# Patient Record
Sex: FEMALE | Race: WHITE | NOT HISPANIC OR LATINO | Employment: OTHER | URBAN - METROPOLITAN AREA
[De-identification: names, ages, dates, MRNs, and addresses within clinical notes are randomized per-mention and may not be internally consistent; named-entity substitution may affect disease eponyms.]

---

## 2020-03-11 ENCOUNTER — OFFICE VISIT (OUTPATIENT)
Dept: UROLOGY | Facility: CLINIC | Age: 75
End: 2020-03-11
Payer: MEDICARE

## 2020-03-11 VITALS
DIASTOLIC BLOOD PRESSURE: 78 MMHG | SYSTOLIC BLOOD PRESSURE: 131 MMHG | HEIGHT: 63 IN | BODY MASS INDEX: 31.18 KG/M2 | WEIGHT: 176 LBS

## 2020-03-11 DIAGNOSIS — N32.81 OVERACTIVE BLADDER: Primary | ICD-10-CM

## 2020-03-11 DIAGNOSIS — N39.0 URINARY TRACT INFECTION WITHOUT HEMATURIA, SITE UNSPECIFIED: ICD-10-CM

## 2020-03-11 LAB
BILIRUB UR QL STRIP: NEGATIVE
GLUCOSE UR QL STRIP: NEGATIVE
KETONES UR QL STRIP: POSITIVE
LEUKOCYTE ESTERASE UR QL STRIP: POSITIVE
PH, POC UA: 5.5
POC AMORP, URINE: ABNORMAL
POC BACTI, URINE: ABNORMAL
POC BLOOD, URINE: POSITIVE
POC CASTS, URINE: ABNORMAL
POC CRYST, URINE: ABNORMAL
POC EPITH, URINE: ABNORMAL
POC HCG, URINE: ABNORMAL
POC HYALIN, URINE: ABNORMAL LPF
POC MUCUS, URINE: +1=
POC NITRATES, URINE: POSITIVE
POC OTHER, URINE: ABNORMAL
POC RBC, URINE: ABNORMAL HPF
POC RESIDUAL URINE VOLUME: 0 ML (ref 0–100)
POC WBC, URINE: ABNORMAL HPF
PROT UR QL STRIP: POSITIVE
SP GR UR STRIP: 1.02 (ref 1–1.03)
UROBILINOGEN UR STRIP-ACNC: 0.2 (ref 0.1–1.1)

## 2020-03-11 PROCEDURE — 51798 POCT BLADDER SCAN: ICD-10-PCS | Mod: S$GLB,,, | Performed by: NURSE PRACTITIONER

## 2020-03-11 PROCEDURE — 99214 OFFICE O/P EST MOD 30 MIN: CPT | Mod: 25,S$GLB,, | Performed by: NURSE PRACTITIONER

## 2020-03-11 PROCEDURE — 99214 PR OFFICE/OUTPT VISIT, EST, LEVL IV, 30-39 MIN: ICD-10-PCS | Mod: 25,S$GLB,, | Performed by: NURSE PRACTITIONER

## 2020-03-11 PROCEDURE — 51798 US URINE CAPACITY MEASURE: CPT | Mod: S$GLB,,, | Performed by: NURSE PRACTITIONER

## 2020-03-11 RX ORDER — OXYBUTYNIN CHLORIDE 5 MG/1
10 TABLET ORAL NIGHTLY
COMMUNITY
End: 2021-04-06

## 2020-03-11 RX ORDER — CEPHALEXIN 250 MG/1
250 CAPSULE ORAL DAILY
COMMUNITY
End: 2020-03-16

## 2020-03-11 RX ORDER — CLONAZEPAM 1 MG/1
TABLET ORAL
COMMUNITY
Start: 2020-02-20

## 2020-03-11 NOTE — PROGRESS NOTES
Subjective:       Patient ID: Laurence Donaldson is a 74 y.o. female.    Chief Complaint: Other (6 mo f/u)      HPI: 74-year-old female known service Dr. Saab  with a history of overactive bladder.  She was last seen August of 2019.  Prior to that she had been on Ditropan with patient's stop taking of her own accord.  She continues with excessive daytime Coca-Cola intake and caffeinated tea.  Daytime she has frequency every 30-45 minutes voiding small volumes.  Since last office visit she has been seen by Dr. Castaneda and was placed back on Ditropan 5 mg tablet x2 q.h.s., with instruction to discontinue or significantly reduce the number of soda and caffeinated beverages through the daytime.  She has not changed her dietary fluid intake or choice of beverages, but does sleep through the night on Ditropan 10 mg.  She returns to clinic today stating that she was also placed on Keflex 250 mg daily to reduce risk of recurring urinary tract infection.  Denies gross hematuria, dysuria or stress incontinence.  No flank pain.  Bowel movements reported daily in normal       Past Medical History:   Past Medical History:   Diagnosis Date    Anxiety     Breast cancer     Depression     Elevated cholesterol     Recurrent urinary tract infection        Past Surgical Historical:   Past Surgical History:   Procedure Laterality Date    BLADDER SURGERY      MASTECTOMY Bilateral     NECK SURGERY          Medications:   Medication List with Changes/Refills   Current Medications    CEPHALEXIN (KEFLEX) 250 MG CAPSULE    Take 250 mg by mouth once daily.    CLONAZEPAM (KLONOPIN) 1 MG TABLET    TK 1 T PO  QD    OXYBUTYNIN (DITROPAN) 5 MG TAB    Take 10 mg by mouth every evening.        Past Social History:   Social History     Socioeconomic History    Marital status: Single     Spouse name: Not on file    Number of children: Not on file    Years of education: Not on file    Highest education level: Not on file   Occupational  History    Not on file   Social Needs    Financial resource strain: Not on file    Food insecurity:     Worry: Not on file     Inability: Not on file    Transportation needs:     Medical: Not on file     Non-medical: Not on file   Tobacco Use    Smoking status: Never Smoker   Substance and Sexual Activity    Alcohol use: Not Currently    Drug use: Not on file    Sexual activity: Not on file   Lifestyle    Physical activity:     Days per week: Not on file     Minutes per session: Not on file    Stress: Not on file   Relationships    Social connections:     Talks on phone: Not on file     Gets together: Not on file     Attends Orthodox service: Not on file     Active member of club or organization: Not on file     Attends meetings of clubs or organizations: Not on file     Relationship status: Not on file   Other Topics Concern    Not on file   Social History Narrative    Not on file       Allergies:   Review of patient's allergies indicates:   Allergen Reactions    Morphine         Family History: History reviewed. No pertinent family history.     Review of Systems:  Review of Systems   Constitutional: Negative for activity change and appetite change.   HENT: Negative for congestion and dental problem.    Respiratory: Negative for chest tightness and shortness of breath.    Cardiovascular: Negative for chest pain.   Gastrointestinal: Negative for abdominal distention and abdominal pain.   Genitourinary: Negative for decreased urine volume, difficulty urinating, dyspareunia, dysuria, enuresis, flank pain, frequency, genital sores, hematuria, pelvic pain and urgency.   Musculoskeletal: Negative for back pain and neck pain.   Allergic/Immunologic: Negative for immunocompromised state.   Neurological: Negative for dizziness.   Hematological: Negative for adenopathy.   Psychiatric/Behavioral: Negative for agitation, behavioral problems and confusion.       Physical Exam:  Physical Exam   Constitutional: She  is oriented to person, place, and time. She appears well-developed and well-nourished.   HENT:   Head: Normocephalic and atraumatic.   Eyes: No scleral icterus.   Neck: Normal range of motion.   Cardiovascular: Intact distal pulses.    Pulmonary/Chest: Effort normal and breath sounds normal.   Abdominal: Soft. She exhibits no distension. There is no tenderness.   Genitourinary: Rectum normal and vagina normal. Pelvic exam was performed with patient supine. There is no rash, tenderness or lesion on the right labia. There is no rash, tenderness or lesion on the left labia.   Musculoskeletal: She exhibits no edema.   Neurological: She is alert and oriented to person, place, and time.   Skin: Skin is warm and dry.     Psychiatric: She has a normal mood and affect.       Assessment/Plan:   Recurring urinary tract infection--urinalysis today WBC 50-60/HPF; RBC 0 2/HPF, 3+ bacteria nitrite positive.  Patient is on Keflex 250 daily at present.  Will culture urine and treat based on sensitivities.  Patient's last cystoscopy with CMG leak point was in 2017.  She had renal ultrasound read is within normal limits on January of 2019.  Will repeat renal ultrasound scheduled for cystoscopy for further evaluation of her recurring UTIs    Overactive bladder--patient gets a good response from oxybutynin 5 mg tablet x2 q.h.s. as she sleeps through the night.  Her symptoms seem to be through the daytime and significantly aggravated by her large intake of sodas and other caffeinated beverages.  Again I have asked her to moderate if she is not able to completely discontinue those she verbalized understanding but states he is not sure she can do that.  I asked her to dose 5 mg of oxybutynin the a.m. to see if this helps somewhat through the morning hours and if so she may want to adjust her daily dosing to 5 mg t.i.d. versus her current regimen.  She verbalized understanding of the same  Problem List Items Addressed This Visit     None

## 2020-03-14 LAB — URINE CULTURE, ROUTINE: NORMAL

## 2020-03-16 RX ORDER — SULFAMETHOXAZOLE AND TRIMETHOPRIM 800; 160 MG/1; MG/1
1 TABLET ORAL 2 TIMES DAILY
Qty: 14 TABLET | Refills: 0 | Status: SHIPPED | OUTPATIENT
Start: 2020-03-16 | End: 2020-03-23

## 2020-03-18 ENCOUNTER — TELEPHONE (OUTPATIENT)
Dept: UROLOGY | Facility: CLINIC | Age: 75
End: 2020-03-18

## 2020-03-18 NOTE — TELEPHONE ENCOUNTER
----- Message from Mara Baker sent at 3/17/2020  3:54 PM CDT -----  Contact: pt-Mr.Gage Cunha   Type:  Test Results    Who Called: Laurence Donaldson  Name of Test (Lab/Mammo/Etc):  Culture results   Date of Test: 3/11/20  Ordering Provider: Chad  Where the test was performed: urology  Would the patient rather a call back or a response via [facility]? call  Best Call Back Number: [phone]  Additional Information:

## 2020-04-28 ENCOUNTER — PROCEDURE VISIT (OUTPATIENT)
Dept: UROLOGY | Facility: CLINIC | Age: 75
End: 2020-04-28
Payer: MEDICARE

## 2020-04-28 VITALS
RESPIRATION RATE: 18 BRPM | HEIGHT: 63 IN | DIASTOLIC BLOOD PRESSURE: 82 MMHG | HEART RATE: 67 BPM | WEIGHT: 177 LBS | OXYGEN SATURATION: 97 % | SYSTOLIC BLOOD PRESSURE: 182 MMHG | BODY MASS INDEX: 31.36 KG/M2

## 2020-04-28 DIAGNOSIS — N39.0 URINARY TRACT INFECTION WITHOUT HEMATURIA, SITE UNSPECIFIED: Primary | ICD-10-CM

## 2020-04-28 PROCEDURE — 52000 CYSTOSCOPY: ICD-10-PCS | Mod: S$GLB,,, | Performed by: UROLOGY

## 2020-04-28 PROCEDURE — 96372 PR INJECTION,THERAP/PROPH/DIAG2ST, IM OR SUBCUT: ICD-10-PCS | Mod: 59,S$GLB,, | Performed by: UROLOGY

## 2020-04-28 PROCEDURE — 52000 CYSTOURETHROSCOPY: CPT | Mod: S$GLB,,, | Performed by: UROLOGY

## 2020-04-28 PROCEDURE — 96372 THER/PROPH/DIAG INJ SC/IM: CPT | Mod: 59,S$GLB,, | Performed by: UROLOGY

## 2020-04-28 RX ORDER — GENTAMICIN SULFATE 40 MG/ML
80 INJECTION, SOLUTION INTRAMUSCULAR; INTRAVENOUS
Status: COMPLETED | OUTPATIENT
Start: 2020-04-28 | End: 2020-04-28

## 2020-04-28 RX ORDER — BENZONATATE 100 MG/1
CAPSULE ORAL
COMMUNITY
Start: 2020-03-13

## 2020-04-28 RX ORDER — CIPROFLOXACIN 500 MG/1
500 TABLET ORAL
Status: COMPLETED | OUTPATIENT
Start: 2020-04-28 | End: 2020-04-28

## 2020-04-28 RX ORDER — PRAVASTATIN SODIUM 20 MG/1
TABLET ORAL
COMMUNITY
Start: 2020-02-16

## 2020-04-28 RX ORDER — AZELASTINE 1 MG/ML
SPRAY, METERED NASAL
COMMUNITY
Start: 2020-03-13

## 2020-04-28 RX ADMIN — GENTAMICIN SULFATE 80 MG: 40 INJECTION, SOLUTION INTRAMUSCULAR; INTRAVENOUS at 10:04

## 2020-04-28 RX ADMIN — CIPROFLOXACIN 500 MG: 500 TABLET ORAL at 09:04

## 2020-04-28 NOTE — PROCEDURES
"Cystoscopy  Date/Time: 4/28/2020 10:00 AM  Performed by: Santy Saab MD  Authorized by: Santy Saab MD     Consent Done?:  Yes (Written)  Time out: Immediately prior to procedure a "time out" was called to verify the correct patient, procedure, equipment, support staff and site/side marked as required.    Indications: recurrent UTIs    Position:  Dorsal lithotomy  Anesthesia:  Intraurethral instillation  Patient sedated?: No    Preparation: Patient was prepped and draped in usual sterile fashion      Scope type:  Rigid cystoscope  Stent inserted: No    Stent removed: No    External exam normal: Yes    Digital exam performed: No    Urethra normal: Yes (no hypermobility no leakage with valsalva)  Bladder neck normal: Bladder neck normal   Normal bladder: fair amount of cystitis cystica along trigone.      Patient tolerance:  Patient tolerated the procedure well with no immediate complications     Will culture urine.  Pt needs to increase water intake.  We gave her a shot of gent and 2 days of cipro.  Based on culture may start her on a low dose antibiotic.  Pr will fu in two weeks      "

## 2020-04-28 NOTE — PATIENT INSTRUCTIONS
Cystoscopy    Cystoscopy is a procedure that lets your doctor look directly inside your urethra and bladder. It can be used to:  · Help diagnose a problem with your urethra, bladder, or kidneys.  · Take a sample (biopsy) of bladder or urethral tissue.  · Treat certain problems (such as removing kidney stones).  · Place a stent to bypass an obstruction.  · Take special X-rays of the kidneys.  Based on the findings, your doctor may recommend other tests or treatments.  What is a cystoscope?  A cystoscope is a telescope-like instrument that contains lenses and fiberoptics (small glass wires that make bright light). The cystoscope may be straight and rigid, or flexible to bend around curves in the urethra. The doctor may look directly into the cystoscope, or project the image onto a monitor.  Getting ready  · Ask your doctor if you should stop taking any medicines before the procedure.  · Ask whether you should avoid eating or drinking anything after midnight before the procedure.  · Follow any other instructions your doctor gives you.  Tell your doctor before the exam if you:  · Take any medicines, such as aspirin or blood thinners  · Have allergies to any medicines  · Are pregnant   The procedure  Cystoscopy is done in the doctors office, surgery center, or hospital. The doctor and a nurse are present during the procedure. It takes only a few minutes, longer if a biopsy, X-ray, or treatment needs to be done.  During the procedure:  · You lie on an exam table on your back, knees bent and legs apart. You are covered with a drape.  · Your urethra and the area around it are washed. Anesthetic jelly may be applied to numb the urethra. Other pain medicine is usually not needed. In some cases, you may be offered a mild sedative to help you relax. If a more extensive procedure is to be done, such as a biopsy or kidney stone removal, general anesthesia may be needed.  · The cystoscope is inserted. A sterile fluid is put  into the bladder to expand it. You may feel pressure from this fluid.  · When the procedure is done, the cystoscope is removed.  After the procedure  If you had a sedative, general anesthesia, or spinal anesthesia, you must have someone drive you home. Once youre home:  · Drink plenty of fluids.  · You may have burning or light bleeding when you urinate--this is normal.  · Medicines may be prescribed to ease any discomfort or prevent infection. Take these as directed.  · Call your doctor if you have heavy bleeding or blood clots, burning that lasts more than a day, a fever over 100°F  (38° C), or trouble urinating.  Date Last Reviewed: 1/1/2017  © 0868-0047 [employer]. [address] [place] [address], [place] [address]. All rights reserved. This information is not intended as a substitute for professional medical care. Always follow your healthcare professional's instructions.

## 2020-04-30 LAB — URINE CULTURE, ROUTINE: NORMAL

## 2020-04-30 RX ORDER — CIPROFLOXACIN 500 MG/1
500 TABLET ORAL 2 TIMES DAILY
Qty: 14 TABLET | Refills: 0 | Status: SHIPPED | OUTPATIENT
Start: 2020-04-30 | End: 2020-05-07

## 2020-05-12 ENCOUNTER — OFFICE VISIT (OUTPATIENT)
Dept: UROLOGY | Facility: CLINIC | Age: 75
End: 2020-05-12
Payer: MEDICARE

## 2020-05-12 VITALS
WEIGHT: 177 LBS | SYSTOLIC BLOOD PRESSURE: 150 MMHG | RESPIRATION RATE: 20 BRPM | BODY MASS INDEX: 31.35 KG/M2 | TEMPERATURE: 98 F | DIASTOLIC BLOOD PRESSURE: 84 MMHG | OXYGEN SATURATION: 96 % | HEART RATE: 90 BPM

## 2020-05-12 DIAGNOSIS — N39.0 URINARY TRACT INFECTION WITHOUT HEMATURIA, SITE UNSPECIFIED: Primary | ICD-10-CM

## 2020-05-12 LAB
BILIRUB UR QL STRIP: POSITIVE
GLUCOSE UR QL STRIP: NEGATIVE
KETONES UR QL STRIP: POSITIVE
LEUKOCYTE ESTERASE UR QL STRIP: POSITIVE
PH, POC UA: 6
POC AMORP, URINE: ABNORMAL
POC BACTI, URINE: ABNORMAL
POC BLOOD, URINE: POSITIVE
POC CASTS, URINE: ABNORMAL
POC CRYST, URINE: ABNORMAL
POC EPITH, URINE: ABNORMAL
POC HCG, URINE: ABNORMAL
POC HYALIN, URINE: ABNORMAL
POC MUCUS, URINE: ABNORMAL
POC NITRATES, URINE: NEGATIVE
POC OTHER, URINE: ABNORMAL
POC RBC, URINE: ABNORMAL HPF
POC RESIDUAL URINE VOLUME: 22 ML (ref 0–100)
POC WBC, URINE: ABNORMAL HPF
PROT UR QL STRIP: POSITIVE
SP GR UR STRIP: 1.02 (ref 1–1.03)
UROBILINOGEN UR STRIP-ACNC: 0.2 (ref 0.1–1.1)

## 2020-05-12 PROCEDURE — 51701 INSERT BLADDER CATHETER: CPT | Mod: S$GLB,,, | Performed by: NURSE PRACTITIONER

## 2020-05-12 PROCEDURE — 51798 US URINE CAPACITY MEASURE: CPT | Mod: S$GLB,,, | Performed by: UROLOGY

## 2020-05-12 PROCEDURE — 51701 PR INSERTION OF NON-INDWELLING BLADDER CATHETERIZATION FOR RESIDUAL UR: ICD-10-PCS | Mod: S$GLB,,, | Performed by: NURSE PRACTITIONER

## 2020-05-12 PROCEDURE — 99214 OFFICE O/P EST MOD 30 MIN: CPT | Mod: S$GLB,,, | Performed by: UROLOGY

## 2020-05-12 PROCEDURE — 51798 POCT BLADDER SCAN: ICD-10-PCS | Mod: S$GLB,,, | Performed by: UROLOGY

## 2020-05-12 PROCEDURE — 99214 PR OFFICE/OUTPT VISIT, EST, LEVL IV, 30-39 MIN: ICD-10-PCS | Mod: S$GLB,,, | Performed by: UROLOGY

## 2020-05-12 NOTE — PROGRESS NOTES
Subjective:       Patient ID: Laurence Donaldson is a 74 y.o. female.    Chief Complaint: Follow-up (cysto fu)      HPI: 74-year-old female follow-up cystoscopy for history of recurring urinary tract infections.  Cystoscopy identified chronic cystitis cystica along the trigone otherwise no significant findings.  No hypermobility of the bladder neck and no leakage with Valsalva.  Most recent urinary culture was Enterococcus treated with Cipro.  Imaging of the upper tracts was ordered but has not been completed.  Currently she states she has feels a sensation of an early urinary tract infection, she reports no discrete symptoms but just feels she has 1 coming on.  No incontinence/constipation.  Denies gross hematuria or flank pain       Past Medical History:   Past Medical History:   Diagnosis Date    Anxiety     Breast cancer     Depression     Elevated cholesterol     Recurrent urinary tract infection        Past Surgical Historical:   Past Surgical History:   Procedure Laterality Date    BLADDER SURGERY      MASTECTOMY Bilateral     NECK SURGERY          Medications:   Medication List with Changes/Refills   Current Medications    AZELASTINE (ASTELIN) 137 MCG (0.1 %) NASAL SPRAY    USE 1 SPRAY IN EACH NOSTRIL BID PRN FOR 10 DAYS    BENZONATATE (TESSALON) 100 MG CAPSULE    TK ONE C PO TID FOR 5 DAYS    CLONAZEPAM (KLONOPIN) 1 MG TABLET    TK 1 T PO  QD    OXYBUTYNIN (DITROPAN) 5 MG TAB    Take 10 mg by mouth every evening.    PRAVASTATIN (PRAVACHOL) 20 MG TABLET    TK 1 T PO QD        Past Social History:   Social History     Socioeconomic History    Marital status: Single     Spouse name: Not on file    Number of children: Not on file    Years of education: Not on file    Highest education level: Not on file   Occupational History    Not on file   Social Needs    Financial resource strain: Not on file    Food insecurity:     Worry: Not on file     Inability: Not on file    Transportation needs:      Medical: Not on file     Non-medical: Not on file   Tobacco Use    Smoking status: Never Smoker   Substance and Sexual Activity    Alcohol use: Not Currently    Drug use: Not on file    Sexual activity: Not on file   Lifestyle    Physical activity:     Days per week: Not on file     Minutes per session: Not on file    Stress: Not on file   Relationships    Social connections:     Talks on phone: Not on file     Gets together: Not on file     Attends Restorationism service: Not on file     Active member of club or organization: Not on file     Attends meetings of clubs or organizations: Not on file     Relationship status: Not on file   Other Topics Concern    Not on file   Social History Narrative    Not on file       Allergies:   Review of patient's allergies indicates:   Allergen Reactions    Morphine         Family History: History reviewed. No pertinent family history.     Review of Systems:  Review of Systems   Constitutional: Negative for activity change and appetite change.   HENT: Negative for congestion and dental problem.    Respiratory: Negative for chest tightness and shortness of breath.    Cardiovascular: Negative for chest pain.   Gastrointestinal: Negative for abdominal distention and abdominal pain.   Genitourinary: Negative for decreased urine volume, difficulty urinating, dyspareunia, dysuria, enuresis, flank pain, frequency, genital sores, hematuria, pelvic pain and urgency.   Musculoskeletal: Negative for back pain and neck pain.   Allergic/Immunologic: Negative for immunocompromised state.   Neurological: Negative for dizziness.   Hematological: Negative for adenopathy.   Psychiatric/Behavioral: Negative for agitation, behavioral problems and confusion.       Physical Exam:  Physical Exam    Assessment/Plan:   Chronic recurring urinary tract infections--chronic cystitis cystica along the trigone was identified on cystoscopy.  She was given gentamicin IM at the time of cystoscopy, and 2  days of Cipro.  Follow up urine culture positive for enterococcus and Cipro 500 mg b.i.d. 14.  Was dispense patient completed same.  Urinalysis today voided specimen too numerous to count WBCs, 3-5 RBCs, 3+ epithelial cells and 4+ bacteria.  Follow-up cath urine WBC 1 100+, RBC 30-40, no epithelials and 1+ bacteria.  Nitrite negative.  PVR 22 cc.  Culture urine and treat based on sensitivities.  At completion of that round of antibiotics will place patient on low-dose maintenance antibiotic 3 times weekly probably Macrobid 100 mg.  Re submitted order for upper tract imaging.  Patient return to clinic in 6 weeks sooner as needed  Problem List Items Addressed This Visit     None

## 2020-05-12 NOTE — PROGRESS NOTES
Pt seen chart reviewed case discussed with NP.  Will culture urine and treat based on sensitivity and then use a low dose antibiotic after that.  Pt to fu in 6 weeks.

## 2020-05-13 ENCOUNTER — TELEPHONE (OUTPATIENT)
Dept: UROLOGY | Facility: CLINIC | Age: 75
End: 2020-05-13

## 2020-05-14 LAB — URINE CULTURE, ROUTINE: NORMAL

## 2020-05-14 RX ORDER — SULFAMETHOXAZOLE AND TRIMETHOPRIM 800; 160 MG/1; MG/1
1 TABLET ORAL 2 TIMES DAILY
Qty: 14 TABLET | Refills: 0 | Status: SHIPPED | OUTPATIENT
Start: 2020-05-14 | End: 2020-05-21

## 2020-06-23 ENCOUNTER — OFFICE VISIT (OUTPATIENT)
Dept: UROLOGY | Facility: CLINIC | Age: 75
End: 2020-06-23
Payer: MEDICARE

## 2020-06-23 VITALS
HEART RATE: 100 BPM | RESPIRATION RATE: 18 BRPM | WEIGHT: 170 LBS | HEIGHT: 63 IN | BODY MASS INDEX: 30.12 KG/M2 | DIASTOLIC BLOOD PRESSURE: 89 MMHG | SYSTOLIC BLOOD PRESSURE: 156 MMHG

## 2020-06-23 DIAGNOSIS — N39.0 URINARY TRACT INFECTION WITHOUT HEMATURIA, SITE UNSPECIFIED: Primary | ICD-10-CM

## 2020-06-23 LAB
BILIRUB UR QL STRIP: NEGATIVE
CLARITY, POC UA: ABNORMAL
COLOR, POC UA: ABNORMAL
GLUCOSE UR QL STRIP: NEGATIVE
KETONES UR QL STRIP: POSITIVE
LEUKOCYTE ESTERASE UR QL STRIP: NEGATIVE
NITRITE, POC UA: ABNORMAL
PH, POC UA: 6
POC AMORP, URINE: ABNORMAL
POC BACTI, URINE: ABNORMAL
POC BLOOD, URINE: POSITIVE
POC CASTS, URINE: ABNORMAL
POC CRYST, URINE: ABNORMAL
POC EPITH, URINE: ABNORMAL
POC HCG, URINE: ABNORMAL
POC HYALIN, URINE: ABNORMAL LPF
POC MUCUS, URINE: ABNORMAL
POC NITRATES, URINE: NEGATIVE
POC OTHER, URINE: ABNORMAL
POC RBC, URINE: ABNORMAL HPF
POC RESIDUAL URINE VOLUME: 14 ML (ref 0–100)
POC WBC, URINE: ABNORMAL HPF
PROT UR QL STRIP: POSITIVE
SP GR UR STRIP: 1.02 (ref 1–1.03)
UROBILINOGEN UR STRIP-ACNC: 0.2 (ref 0.1–1.1)

## 2020-06-23 PROCEDURE — 51798 US URINE CAPACITY MEASURE: CPT | Mod: S$GLB,,, | Performed by: UROLOGY

## 2020-06-23 PROCEDURE — 51798 POCT BLADDER SCAN: ICD-10-PCS | Mod: S$GLB,,, | Performed by: UROLOGY

## 2020-06-23 PROCEDURE — 99214 PR OFFICE/OUTPT VISIT, EST, LEVL IV, 30-39 MIN: ICD-10-PCS | Mod: S$GLB,,, | Performed by: UROLOGY

## 2020-06-23 PROCEDURE — 99214 OFFICE O/P EST MOD 30 MIN: CPT | Mod: S$GLB,,, | Performed by: UROLOGY

## 2020-06-23 NOTE — PROGRESS NOTES
Subjective:       Patient ID: Laurence Donaldson is a 74 y.o. female.    Chief Complaint: 6 wk f/u      HPI: 74-year-old female 6 week follow-up known service Dr. Saab a history of recurring UTIs.  She underwent cystoscopy identifying cystitis cystica near the trigone.  She had a positive urine culture for Klebsiella and Enterococcus.  Was treated based on sensitivities and placed on low-dose antibiotic 3 times weekly.  Returns to clinic today for re-evaluation.  Patient states she has been doing well until yesterday when she started to notice a little cloudiness to the urine.  She denies any dysuria, frequency, urgency, incontinence, gross hematuria.  She has been afebrile.  She states she was not told to start a maintenance antibiotic 3 times weekly.  Therefore she has not been on antibiotics since completion of her last round post urine culture.  No constipation       Past Medical History:   Past Medical History:   Diagnosis Date    Anxiety     Breast cancer     Depression     Elevated cholesterol     Recurrent urinary tract infection        Past Surgical Historical:   Past Surgical History:   Procedure Laterality Date    BLADDER SURGERY      MASTECTOMY Bilateral     NECK SURGERY          Medications:   Medication List with Changes/Refills   Current Medications    AZELASTINE (ASTELIN) 137 MCG (0.1 %) NASAL SPRAY    USE 1 SPRAY IN EACH NOSTRIL BID PRN FOR 10 DAYS    BENZONATATE (TESSALON) 100 MG CAPSULE    TK ONE C PO TID FOR 5 DAYS    CLONAZEPAM (KLONOPIN) 1 MG TABLET    TK 1 T PO  QD    OXYBUTYNIN (DITROPAN) 5 MG TAB    Take 10 mg by mouth every evening.    PRAVASTATIN (PRAVACHOL) 20 MG TABLET    TK 1 T PO QD        Past Social History:   Social History     Socioeconomic History    Marital status: Single     Spouse name: Not on file    Number of children: Not on file    Years of education: Not on file    Highest education level: Not on file   Occupational History    Not on file   Social Needs     Financial resource strain: Not on file    Food insecurity     Worry: Not on file     Inability: Not on file    Transportation needs     Medical: Not on file     Non-medical: Not on file   Tobacco Use    Smoking status: Never Smoker   Substance and Sexual Activity    Alcohol use: Not Currently    Drug use: Not on file    Sexual activity: Not on file   Lifestyle    Physical activity     Days per week: Not on file     Minutes per session: Not on file    Stress: Not on file   Relationships    Social connections     Talks on phone: Not on file     Gets together: Not on file     Attends [employer] service: Not on file     Active member of club or organization: Not on file     Attends meetings of clubs or organizations: Not on file     Relationship status: Not on file   Other Topics Concern    Not on file   Social History Narrative    Not on file       Allergies:   Review of patient's allergies indicates:   Allergen Reactions    Morphine         Family History: History reviewed. No pertinent family history.     Review of Systems:  Review of Systems   Constitutional: Negative for activity change and appetite change.   HENT: Negative for congestion and dental problem.    Respiratory: Negative for chest tightness and shortness of breath.    Cardiovascular: Negative for chest pain.   Gastrointestinal: Negative for abdominal distention and abdominal pain.   Genitourinary: Negative for decreased urine volume, difficulty urinating, dyspareunia, dysuria, enuresis, flank pain, frequency, genital sores, hematuria, pelvic pain and urgency.   Musculoskeletal: Negative for back pain and neck pain.   Allergic/Immunologic: Negative for immunocompromised state.   Neurological: Negative for dizziness.   Hematological: Negative for adenopathy.   Psychiatric/Behavioral: Negative for agitation, behavioral problems and confusion.       Physical Exam:  Physical Exam   Constitutional: She is oriented to person, place, and time. She  appears well-developed.   HENT:   Head: Normocephalic and atraumatic.   Eyes: No scleral icterus.   Neck: Normal range of motion.   Pulmonary/Chest: Effort normal and breath sounds normal.   Abdominal: Soft. She exhibits no distension. There is no abdominal tenderness.   Genitourinary:    Vagina and rectum normal.      Pelvic exam was performed with patient supine.   There is no rash, tenderness or lesion on the right labia. There is no rash, tenderness or lesion on the left labia.   Neurological: She is alert and oriented to person, place, and time.   Skin: Skin is warm and dry.         Assessment/Plan:   Recurring urinary tract infections--pt. states she has had these throughout her life span .  Imaging of the kidneys identified no acute pathologies.  Cystoscopy revealed cystitis cystica of the trigone.  Urinalysis today WBC 5-10, RBC 0-3, no epithelial, 4+ bacteria nitrite negative.  Culture urine and treat based on sensitivities when available.  PVR 16 mL. I gave her a written prescription for Macrobid 100 mg,#12, 1 p.o. every Monday Wednesday Friday with refills.  She will start this after her current urine culture is resulted in treated based on sensitivities.  She verbalized understanding of this.  Will see her in 3 months sooner on an as-needed basis.    Problem List Items Addressed This Visit     None      Visit Diagnoses     Urinary tract infection without hematuria, site unspecified    -  Primary    Relevant Orders    Urine culture    POCT Urinalysis (w/[facility] Option)

## 2020-06-25 LAB — URINE CULTURE, ROUTINE: NORMAL

## 2020-06-25 RX ORDER — AMOXICILLIN AND CLAVULANATE POTASSIUM 875; 125 MG/1; MG/1
1 TABLET, FILM COATED ORAL 2 TIMES DAILY
Qty: 14 TABLET | Refills: 0 | Status: SHIPPED | OUTPATIENT
Start: 2020-06-25 | End: 2021-04-06

## 2020-07-01 ENCOUNTER — TELEPHONE (OUTPATIENT)
Dept: UROLOGY | Facility: CLINIC | Age: 75
End: 2020-07-01

## 2020-07-01 NOTE — TELEPHONE ENCOUNTER
----- Message from Janey Min sent at 7/1/2020 11:45 AM CDT -----  Regarding: Prescription  Contact: Granddaughter Peter Bingham  Patient's granddaughter Peter Bingham would like to speak to nurse regarding a prescription given to patient. Call back number [phone]. Tks

## 2020-09-17 ENCOUNTER — TELEPHONE (OUTPATIENT)
Dept: UROLOGY | Facility: CLINIC | Age: 75
End: 2020-09-17

## 2020-09-17 NOTE — TELEPHONE ENCOUNTER
----- Message from Pauline Hart sent at 9/17/2020 10:02 AM CDT -----  Regarding: pt (grandson)  Caller would like to speak with the nurse. Pt seem to have a bad bladder infection. They are located in [place] and would like some medication to be called out. Please call back at [phone]

## 2020-10-07 ENCOUNTER — OFFICE VISIT (OUTPATIENT)
Dept: UROLOGY | Facility: CLINIC | Age: 75
End: 2020-10-07
Payer: MEDICARE

## 2020-10-07 VITALS
RESPIRATION RATE: 18 BRPM | SYSTOLIC BLOOD PRESSURE: 135 MMHG | WEIGHT: 170 LBS | HEART RATE: 69 BPM | HEIGHT: 63 IN | DIASTOLIC BLOOD PRESSURE: 85 MMHG | BODY MASS INDEX: 30.12 KG/M2

## 2020-10-07 DIAGNOSIS — N39.0 RECURRENT UTI: Primary | ICD-10-CM

## 2020-10-07 PROCEDURE — 99213 OFFICE O/P EST LOW 20 MIN: CPT | Mod: S$GLB,,, | Performed by: UROLOGY

## 2020-10-07 PROCEDURE — 99213 PR OFFICE/OUTPT VISIT, EST, LEVL III, 20-29 MIN: ICD-10-PCS | Mod: S$GLB,,, | Performed by: UROLOGY

## 2020-10-07 RX ORDER — NITROFURANTOIN (MACROCRYSTALS) 100 MG/1
100 CAPSULE ORAL
Qty: 12 CAPSULE | Refills: 5 | Status: SHIPPED | OUTPATIENT
Start: 2020-10-07 | End: 2021-04-06

## 2020-10-07 RX ORDER — NITROFURANTOIN (MACROCRYSTALS) 100 MG/1
100 CAPSULE ORAL EVERY 12 HOURS
Qty: 10 CAPSULE | Refills: 0 | Status: SHIPPED | OUTPATIENT
Start: 2020-10-07 | End: 2020-10-12

## 2020-10-07 NOTE — PROGRESS NOTES
Subjective:       Patient ID: Laurence Donaldson is a 75 y.o. female.    Chief Complaint: Urinary Tract Infection      HPI: 75-year-old female, patient Dr. Saab, presents today with complaint of a kidney infection.    Patient states for approximately 1 month she feels she has had a kidney infection.  Patient states she has a dark color urine.  Also states she has an odor to urine.  Patient states she will have episodes of frequency.  Patient denies any pain or burning with urination.  Denies any difficulty voiding.  Denies any blood in the urine.  Denies any fever.  Denies any flank pain.    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  Patient was put on Macrobid 100 mg 3 times a week.  However, patient states she ran out of the medication.  Patient admits she still is drinking a lot of soda.    No other urinary complaints.  All other health problems appear stable at this time.       Past Medical History:   Past Medical History:   Diagnosis Date    Anxiety     Breast cancer     Depression     Elevated cholesterol     Recurrent urinary tract infection        Past Surgical Historical:   Past Surgical History:   Procedure Laterality Date    BLADDER SURGERY      MASTECTOMY Bilateral     NECK SURGERY          Medications:   Medication List with Changes/Refills   Current Medications    AMOXICILLIN-CLAVULANATE 875-125MG (AUGMENTIN) 875-125 MG PER TABLET    Take 1 tablet by mouth 2 (two) times daily.    AZELASTINE (ASTELIN) 137 MCG (0.1 %) NASAL SPRAY    USE 1 SPRAY IN EACH NOSTRIL BID PRN FOR 10 DAYS    BENZONATATE (TESSALON) 100 MG CAPSULE    TK ONE C PO TID FOR 5 DAYS    CLONAZEPAM (KLONOPIN) 1 MG TABLET    TK 1 T PO  QD    OXYBUTYNIN (DITROPAN) 5 MG TAB    Take 10 mg by mouth every evening.    PRAVASTATIN (PRAVACHOL) 20 MG TABLET    TK 1 T PO QD        Past Social History:   Social History     Socioeconomic History    Marital status: Single     Spouse name: Not on file    Number of  children: Not on file    Years of education: Not on file    Highest education level: Not on file   Occupational History    Not on file   Social Needs    Financial resource strain: Not on file    Food insecurity     Worry: Not on file     Inability: Not on file    Transportation needs     Medical: Not on file     Non-medical: Not on file   Tobacco Use    Smoking status: Never Smoker   Substance and Sexual Activity    Alcohol use: Not Currently    Drug use: Not on file    Sexual activity: Not on file   Lifestyle    Physical activity     Days per week: Not on file     Minutes per session: Not on file    Stress: Not on file   Relationships    Social connections     Talks on phone: Not on file     Gets together: Not on file     Attends Oriental orthodox service: Not on file     Active member of club or organization: Not on file     Attends meetings of clubs or organizations: Not on file     Relationship status: Not on file   Other Topics Concern    Not on file   Social History Narrative    Not on file       Allergies:   Review of patient's allergies indicates:   Allergen Reactions    Morphine         Family History: History reviewed. No pertinent family history.     Review of Systems:  Review of Systems   Constitutional: Negative for activity change and appetite change.   HENT: Negative for congestion and dental problem.    Respiratory: Negative for chest tightness and shortness of breath.    Cardiovascular: Negative for chest pain.   Gastrointestinal: Negative for abdominal distention.   Genitourinary: Positive for frequency. Negative for decreased urine volume, difficulty urinating, dyspareunia, dysuria, enuresis, flank pain, genital sores, hematuria, pelvic pain and urgency.   Musculoskeletal: Negative for back pain and neck pain.   Allergic/Immunologic: Negative for immunocompromised state.   Neurological: Negative for dizziness.   Hematological: Negative for adenopathy.   Psychiatric/Behavioral: Negative for  agitation, behavioral problems and confusion.       Physical Exam:  Physical Exam   Nursing note and vitals reviewed.  Constitutional: She is oriented to person, place, and time. She appears well-developed.   HENT:   Head: Normocephalic.   Cardiovascular: Normal rate, regular rhythm and normal heart sounds.    Pulmonary/Chest: Effort normal and breath sounds normal.   Abdominal: Soft. Bowel sounds are normal.   Neurological: She is alert and oriented to person, place, and time.   Skin: Skin is warm and dry.      Urinalysis:  Trace intact blood, but above 6-10.  White blood cells 0-5, epithelial +1, bacteria +1, mucus +1, negative leukocytes, negative nitrates.    Assessment/Plan:   Recurring UTIs:  Will restart the patient on Macrobid.  Will provide 5 day dosing of Macrobid 2 times a day.  Patient will then start Macrobid 3 times a week.  Will culture the urine.  Will change antibiotics if indicated.  Patient encouraged increase fluids.  Patient encouraged to decrease soda intake.    Will plan follow-up in 2 weeks, sooner if needed.  Problem List Items Addressed This Visit     None      Visit Diagnoses     Recurrent UTI    -  Primary    Relevant Orders    POCT Urinalysis (w/Micro Option)

## 2020-10-07 NOTE — PROGRESS NOTES
Pt seen chart reviewed case discussed with marce.  Will culture urine place on macrobid for 5 days then her normal routine.  Fu in two weeks

## 2020-10-21 ENCOUNTER — OFFICE VISIT (OUTPATIENT)
Dept: UROLOGY | Facility: CLINIC | Age: 75
End: 2020-10-21
Payer: MEDICARE

## 2020-10-21 VITALS
DIASTOLIC BLOOD PRESSURE: 77 MMHG | HEIGHT: 63 IN | BODY MASS INDEX: 30.12 KG/M2 | SYSTOLIC BLOOD PRESSURE: 169 MMHG | RESPIRATION RATE: 18 BRPM | WEIGHT: 170 LBS | HEART RATE: 69 BPM

## 2020-10-21 DIAGNOSIS — N39.0 RECURRENT UTI: Primary | ICD-10-CM

## 2020-10-21 DIAGNOSIS — R32 FEMALE INCONTINENCE: ICD-10-CM

## 2020-10-21 PROCEDURE — 99213 OFFICE O/P EST LOW 20 MIN: CPT | Mod: S$GLB,,, | Performed by: NURSE PRACTITIONER

## 2020-10-21 PROCEDURE — 99213 PR OFFICE/OUTPT VISIT, EST, LEVL III, 20-29 MIN: ICD-10-PCS | Mod: S$GLB,,, | Performed by: NURSE PRACTITIONER

## 2020-10-21 NOTE — PROGRESS NOTES
Subjective:       Patient ID: Laurence Donaldson is a 75 y.o. female.    Chief Complaint: 2 wk f/u (Completed macrobid today (5 days) / will resume macrodantin 3 x wk) and Recurrent UTI      HPI: 75-year-old female, patient Dr. Saab, presents for 2 week evaluation.  Patient seen 2 weeks ago with urinary tract infection.  Patient was put on Macrobid.  Patient states she just recently got her Macrobid.  She states she just finished her antibiotics.  Patient is on Macrodantin 3 times a week.  Patient states she is doing this time.  Patient is not having any pain or burning urination.  Denies any odor to the urine.  Denies any flank pain.  Denies any fever.    Patient does complain of some incontinence.  Patient states she has been having leakage.  Patient primarily has leakage at night but will have episodes of leakage during the day.  Patient denies leakage with coughing or sneezing.  Patient states that leakage occurs primarily if she does not get to the restroom in time.  Patient expresses interest in Botox injections.  Patient had a cysto 04/20/2020.  Urethra was normal.  Bladder neck was normal.  Bladder did show some cystitis cystica.    No other urinary complaints.  All other health problems appear stable at this time.       Past Medical History:   Past Medical History:   Diagnosis Date    Anxiety     Breast cancer     Depression     Elevated cholesterol     Recurrent urinary tract infection        Past Surgical Historical:   Past Surgical History:   Procedure Laterality Date    BLADDER SURGERY  09/13/2011    Obtryx Sling (Dr. Medina)    MASTECTOMY Bilateral     NECK SURGERY          Medications:   Medication List with Changes/Refills   Current Medications    AMOXICILLIN-CLAVULANATE 875-125MG (AUGMENTIN) 875-125 MG PER TABLET    Take 1 tablet by mouth 2 (two) times daily.    AZELASTINE (ASTELIN) 137 MCG (0.1 %) NASAL SPRAY    USE 1 SPRAY IN EACH NOSTRIL BID PRN FOR 10 DAYS    BENZONATATE (TESSALON)  100 MG CAPSULE    TK ONE C PO TID FOR 5 DAYS    CLONAZEPAM (KLONOPIN) 1 MG TABLET    TK 1 T PO  QD    NITROFURANTOIN (MACRODANTIN) 100 MG CAPSULE    Take 1 capsule (100 mg total) by mouth 3 (three) times a week.    OXYBUTYNIN (DITROPAN) 5 MG TAB    Take 10 mg by mouth every evening.    PRAVASTATIN (PRAVACHOL) 20 MG TABLET    TK 1 T PO QD        Past Social History:   Social History     Socioeconomic History    Marital status: Single     Spouse name: Not on file    Number of children: Not on file    Years of education: Not on file    Highest education level: Not on file   Occupational History    Not on file   Social Needs    Financial resource strain: Not on file    Food insecurity     Worry: Not on file     Inability: Not on file    Transportation needs     Medical: Not on file     Non-medical: Not on file   Tobacco Use    Smoking status: Never Smoker   Substance and Sexual Activity    Alcohol use: Not Currently    Drug use: Not on file    Sexual activity: Not on file   Lifestyle    Physical activity     Days per week: Not on file     Minutes per session: Not on file    Stress: Not on file   Relationships    Social connections     Talks on phone: Not on file     Gets together: Not on file     Attends Uatsdin service: Not on file     Active member of club or organization: Not on file     Attends meetings of clubs or organizations: Not on file     Relationship status: Not on file   Other Topics Concern    Not on file   Social History Narrative    Not on file       Allergies:   Review of patient's allergies indicates:   Allergen Reactions    Morphine         Family History: History reviewed. No pertinent family history.     Review of Systems:  Review of Systems   Constitutional: Negative for activity change and appetite change.   HENT: Negative for congestion and dental problem.    Respiratory: Negative for chest tightness and shortness of breath.    Cardiovascular: Negative for chest pain.    Gastrointestinal: Negative for abdominal distention.   Genitourinary: Negative for decreased urine volume, difficulty urinating, dyspareunia, dysuria, enuresis, flank pain, frequency, genital sores, hematuria, pelvic pain and urgency.   Musculoskeletal: Negative for back pain and neck pain.   Allergic/Immunologic: Negative for immunocompromised state.   Neurological: Negative for dizziness.   Hematological: Negative for adenopathy.   Psychiatric/Behavioral: Negative for agitation, behavioral problems and confusion.       Physical Exam:  Physical Exam  Vitals signs and nursing note reviewed.   Constitutional:       Appearance: She is well-developed.   HENT:      Head: Normocephalic.   Cardiovascular:      Rate and Rhythm: Normal rate and regular rhythm.      Heart sounds: Normal heart sounds.   Pulmonary:      Effort: Pulmonary effort is normal.      Breath sounds: Normal breath sounds.   Abdominal:      General: Bowel sounds are normal.      Palpations: Abdomen is soft.   Skin:     General: Skin is warm and dry.   Neurological:      Mental Status: She is alert and oriented to person, place, and time.      Urinalysis:  Blood cells 3-5, epithelial +3, bacteria +1, mucus +2.  Negative for leukocytes or nitrates.  Trace intact blood, red blood cells 2.    Assessment/Plan:   1.  Recurring UTIs:  Patient appears to be doing well at this time.  Patient will continue Macrodantin 3 times a week.  Patient could increase fluids.  Patient could not hold her bladder.  Patient encouraged to decrease sodium intake.    2.  Female incontinence:  Will try low-dose Myrbetriq.  Patient does not recall she has tried Myrbetriq before or not.       Will plan follow-up in 2 weeks, sooner if needed.  Will further discuss Botox at that time.  Problem List Items Addressed This Visit     None      Visit Diagnoses     Recurrent UTI    -  Primary    Relevant Orders    POCT Urinalysis (w/Micro Option)    Female incontinence

## 2020-11-04 ENCOUNTER — OFFICE VISIT (OUTPATIENT)
Dept: UROLOGY | Facility: CLINIC | Age: 75
End: 2020-11-04
Payer: MEDICARE

## 2020-11-04 VITALS
SYSTOLIC BLOOD PRESSURE: 143 MMHG | DIASTOLIC BLOOD PRESSURE: 75 MMHG | HEIGHT: 63 IN | HEART RATE: 80 BPM | WEIGHT: 170 LBS | BODY MASS INDEX: 30.12 KG/M2 | RESPIRATION RATE: 18 BRPM

## 2020-11-04 DIAGNOSIS — R30.0 DYSURIA: Primary | ICD-10-CM

## 2020-11-04 DIAGNOSIS — N39.41 URGE INCONTINENCE: ICD-10-CM

## 2020-11-04 LAB — PVR: 0 WU

## 2020-11-04 PROCEDURE — 51701 INSERT,NON-INDWELLING BLADDER CATHETER: ICD-10-PCS | Mod: S$GLB,,, | Performed by: UROLOGY

## 2020-11-04 PROCEDURE — 99214 PR OFFICE/OUTPT VISIT, EST, LEVL IV, 30-39 MIN: ICD-10-PCS | Mod: 25,S$GLB,, | Performed by: UROLOGY

## 2020-11-04 PROCEDURE — 51701 INSERT BLADDER CATHETER: CPT | Mod: S$GLB,,, | Performed by: UROLOGY

## 2020-11-04 PROCEDURE — 99214 OFFICE O/P EST MOD 30 MIN: CPT | Mod: 25,S$GLB,, | Performed by: UROLOGY

## 2020-11-04 NOTE — PROGRESS NOTES
Subjective:       Patient ID: Laurence Donaldson is a 75 y.o. female.    Chief Complaint: 2 wk f/u (no change/improvement - discuss botox)      HPI: 74 yo female with history of urge incontinence and rutis.  Pt has failed oral agents and is wanting botox.  Pt has no dysuria today       Past Medical History:   Past Medical History:   Diagnosis Date    Anxiety     Breast cancer     Depression     Elevated cholesterol     Recurrent urinary tract infection        Past Surgical Historical:   Past Surgical History:   Procedure Laterality Date    BLADDER SURGERY  09/13/2011    Obtryx Sling (Dr. Medina)    MASTECTOMY Bilateral     NECK SURGERY          Medications:   Medication List with Changes/Refills   Current Medications    AMOXICILLIN-CLAVULANATE 875-125MG (AUGMENTIN) 875-125 MG PER TABLET    Take 1 tablet by mouth 2 (two) times daily.    AZELASTINE (ASTELIN) 137 MCG (0.1 %) NASAL SPRAY    USE 1 SPRAY IN EACH NOSTRIL BID PRN FOR 10 DAYS    BENZONATATE (TESSALON) 100 MG CAPSULE    TK ONE C PO TID FOR 5 DAYS    CLONAZEPAM (KLONOPIN) 1 MG TABLET    TK 1 T PO  QD    NITROFURANTOIN (MACRODANTIN) 100 MG CAPSULE    Take 1 capsule (100 mg total) by mouth 3 (three) times a week.    OXYBUTYNIN (DITROPAN) 5 MG TAB    Take 10 mg by mouth every evening.    PRAVASTATIN (PRAVACHOL) 20 MG TABLET    TK 1 T PO QD        Past Social History:   Social History     Socioeconomic History    Marital status: Single     Spouse name: Not on file    Number of children: Not on file    Years of education: Not on file    Highest education level: Not on file   Occupational History    Not on file   Social Needs    Financial resource strain: Not on file    Food insecurity     Worry: Not on file     Inability: Not on file    Transportation needs     Medical: Not on file     Non-medical: Not on file   Tobacco Use    Smoking status: Never Smoker   Substance and Sexual Activity    Alcohol use: Not Currently    Drug use: Not on file     Sexual activity: Not on file   Lifestyle    Physical activity     Days per week: Not on file     Minutes per session: Not on file    Stress: Not on file   Relationships    Social connections     Talks on phone: Not on file     Gets together: Not on file     Attends Adventist service: Not on file     Active member of club or organization: Not on file     Attends meetings of clubs or organizations: Not on file     Relationship status: Not on file   Other Topics Concern    Not on file   Social History Narrative    Not on file       Allergies:   Review of patient's allergies indicates:   Allergen Reactions    Morphine         Family History: History reviewed. No pertinent family history.     Review of Systems:  Review of Systems   Constitutional: Negative for activity change and appetite change.   HENT: Negative for congestion and dental problem.    Respiratory: Negative for chest tightness and shortness of breath.    Cardiovascular: Negative for chest pain.   Gastrointestinal: Negative for abdominal distention and abdominal pain.   Genitourinary: Negative for decreased urine volume, difficulty urinating, dyspareunia, dysuria, enuresis, flank pain, frequency, genital sores, hematuria, pelvic pain and urgency.   Musculoskeletal: Negative for back pain and neck pain.   Allergic/Immunologic: Negative for immunocompromised state.   Neurological: Negative for dizziness.   Hematological: Negative for adenopathy.   Psychiatric/Behavioral: Negative for agitation, behavioral problems and confusion.       Physical Exam:  Physical Exam  Vitals signs and nursing note reviewed.   Constitutional:       Appearance: She is well-developed.   HENT:      Head: Normocephalic.   Eyes:      Pupils: Pupils are equal, round, and reactive to light.   Neck:      Musculoskeletal: Normal range of motion and neck supple.   Cardiovascular:      Rate and Rhythm: Normal rate and regular rhythm.      Heart sounds: Normal heart sounds.    Pulmonary:      Effort: Pulmonary effort is normal.      Breath sounds: Normal breath sounds.   Abdominal:      General: Bowel sounds are normal.      Palpations: Abdomen is soft.   Musculoskeletal: Normal range of motion.   Skin:     General: Skin is warm and dry.   Neurological:      Mental Status: She is alert and oriented to person, place, and time.   Psychiatric:         Behavior: Behavior normal.     ua--three plus bacteria  Cath ua is pending    Assessment/Plan:     recurrent utis--will culture cath ua and treat based on sensitivities    Will schedule botox injection 100 units in a week or so  Problem List Items Addressed This Visit     None      Visit Diagnoses     Dysuria    -  Primary    Relevant Orders    POCT Urinalysis (w/Micro Option)

## 2020-11-05 ENCOUNTER — TELEPHONE (OUTPATIENT)
Dept: UROLOGY | Facility: CLINIC | Age: 75
End: 2020-11-05

## 2020-11-05 NOTE — TELEPHONE ENCOUNTER
----- Message from Pauline Tanner sent at 11/5/2020  4:10 PM CST -----  Regarding: pt  Pt called in regards to her medication for the surgery. Pt is checking status. Please call back at [phone]      [facility] - [place], [place] - [address]  [address]  [place] [address]  Phone: [phone] Fax: [phone]

## 2020-11-06 LAB — URINE CULTURE, ROUTINE: NORMAL

## 2020-11-06 NOTE — TELEPHONE ENCOUNTER
----- Message from Santy Saab MD sent at 11/6/2020 12:26 PM CST -----  Start macrobid 100 mg bid today for 7 days will proceed with scheduled botox as scheduled

## 2020-11-09 RX ORDER — NITROFURANTOIN 25; 75 MG/1; MG/1
100 CAPSULE ORAL 2 TIMES DAILY
Qty: 14 CAPSULE | Refills: 0 | Status: SHIPPED | OUTPATIENT
Start: 2020-11-09 | End: 2020-11-16

## 2020-11-10 LAB
ANION GAP SERPL CALC-SCNC: 6 MMOL/L (ref 3–11)
APPEARANCE, UA: CLEAR
BACTERIA SPEC CULT: ABNORMAL /HPF
BASOPHILS NFR BLD: 0.9 % (ref 0–3)
BILIRUB UR QL STRIP: NEGATIVE MG/DL
BUN SERPL-MCNC: 12 MG/DL (ref 7–18)
BUN/CREAT SERPL: 13.18 RATIO (ref 7–18)
CALCIUM BLD-MCNC: NEGATIVE MG/DL
CALCIUM SERPL-MCNC: 10.2 MG/DL (ref 8.8–10.5)
CHLORIDE SERPL-SCNC: 104 MMOL/L (ref 100–108)
CO2 SERPL-SCNC: 32 MMOL/L (ref 21–32)
COLOR UR: ABNORMAL
COVID-19 AB, IGM: NEGATIVE
CREAT SERPL-MCNC: 0.91 MG/DL (ref 0.55–1.02)
ELLIPTOCYTES: NORMAL
EOSINOPHIL NFR BLD: 1.5 % (ref 1–3)
ERYTHROCYTE [DISTWIDTH] IN BLOOD BY AUTOMATED COUNT: 15.1 % (ref 12.5–18)
GFR ESTIMATION: 60
GLUCOSE (UA): NORMAL MG/DL
GLUCOSE SERPL-MCNC: 90 MG/DL (ref 70–110)
HCT VFR BLD AUTO: 40 % (ref 37–47)
HGB BLD-MCNC: 12.3 G/DL (ref 12–16)
HGB UR QL STRIP: 50 /UL
HYPOCHROMIA BLD QL SMEAR: NORMAL
KETONES UR QL STRIP: NEGATIVE MG/DL
LEUKOCYTE ESTERASE UR QL STRIP: NEGATIVE /UL
LYMPHOCYTES NFR BLD: 27.1 % (ref 25–40)
MCH RBC QN AUTO: 26.1 PG (ref 27–31.2)
MCHC RBC AUTO-ENTMCNC: 30.8 G/DL (ref 31.8–35.4)
MCV RBC AUTO: 84.9 FL (ref 80–97)
MONOCYTES NFR BLD: 10.7 % (ref 1–15)
NEUTROPHILS # BLD AUTO: 6.13 10*3/UL (ref 1.8–7.7)
NEUTROPHILS NFR BLD: 59.4 % (ref 37–80)
NITRITE UR QL STRIP: NEGATIVE
NUCLEATED RED BLOOD CELLS: 0 %
PATIENT EMPLOYED IN HEALTHCARE: NO
PATIENT HAS SYMPTOMS FOR CONDITION OF INTEREST: NO
PATIENT HOSPITALIZED BC COND: NO
PATIENT IN CONGREGATE CARE: NO
PH UR STRIP: 6 PH (ref 5–9)
PLATELETS: 380 10*3/UL (ref 142–424)
POTASSIUM SERPL-SCNC: 3.3 MMOL/L (ref 3.6–5.2)
PROT UR QL STRIP: 30 MG/DL
RBC # BLD AUTO: 4.71 10*6/UL (ref 4.2–5.4)
RBC #/AREA URNS HPF: ABNORMAL /HPF (ref 0–2)
SERVICE COMMENT 03: ABNORMAL
SODIUM BLD-SCNC: 142 MMOL/L (ref 135–145)
SP GR UR STRIP: 1.02 (ref 1–1.03)
SPECIMEN COLLECTION METHOD, URINE: ABNORMAL
SQUAMOUS EPITHELIAL, UA: ABNORMAL /LPF
UROBILINOGEN UR STRIP-ACNC: NORMAL MG/DL
WBC # BLD: 10.3 10*3/UL (ref 4.6–10.2)
WBC #/AREA URNS HPF: ABNORMAL /HPF (ref 0–5)

## 2020-11-12 ENCOUNTER — OUTSIDE PLACE OF SERVICE (OUTPATIENT)
Dept: UROLOGY | Facility: CLINIC | Age: 75
End: 2020-11-12
Payer: MEDICARE

## 2020-11-12 PROCEDURE — 52287 PR CYSTOURETHROSCOPY WITH INJ FOR CHEMODENERVATION: ICD-10-PCS | Mod: ,,, | Performed by: UROLOGY

## 2020-11-12 PROCEDURE — 52287 CYSTOSCOPY CHEMODENERVATION: CPT | Mod: ,,, | Performed by: UROLOGY

## 2020-11-24 ENCOUNTER — TELEPHONE (OUTPATIENT)
Dept: UROLOGY | Facility: CLINIC | Age: 75
End: 2020-11-24

## 2020-11-24 NOTE — TELEPHONE ENCOUNTER
----- Message from Regla Alfaro sent at 11/16/2020  8:26 AM CST -----  Regarding: COVID FU  SURGERY-11/12/2020

## 2020-12-02 ENCOUNTER — OFFICE VISIT (OUTPATIENT)
Dept: UROLOGY | Facility: CLINIC | Age: 75
End: 2020-12-02
Payer: MEDICARE

## 2020-12-02 VITALS
RESPIRATION RATE: 16 BRPM | BODY MASS INDEX: 26.95 KG/M2 | HEART RATE: 60 BPM | SYSTOLIC BLOOD PRESSURE: 189 MMHG | WEIGHT: 157.88 LBS | HEIGHT: 64 IN | OXYGEN SATURATION: 97 % | DIASTOLIC BLOOD PRESSURE: 86 MMHG

## 2020-12-02 DIAGNOSIS — N39.41 URGE INCONTINENCE: Primary | ICD-10-CM

## 2020-12-02 LAB — POC RESIDUAL URINE VOLUME: 0 ML (ref 0–100)

## 2020-12-02 PROCEDURE — 51798 POCT BLADDER SCAN: ICD-10-PCS | Mod: S$GLB,,, | Performed by: UROLOGY

## 2020-12-02 PROCEDURE — 99213 OFFICE O/P EST LOW 20 MIN: CPT | Mod: S$GLB,,, | Performed by: UROLOGY

## 2020-12-02 PROCEDURE — 51798 US URINE CAPACITY MEASURE: CPT | Mod: S$GLB,,, | Performed by: UROLOGY

## 2020-12-02 PROCEDURE — 99213 PR OFFICE/OUTPT VISIT, EST, LEVL III, 20-29 MIN: ICD-10-PCS | Mod: S$GLB,,, | Performed by: UROLOGY

## 2020-12-02 NOTE — PROGRESS NOTES
76 yo female fu sp botox injection and history of utis.  Patient denies any dysuria.patient has noticed improvement in frequency  Subjective:       Patient ID: Laurence Donaldson is a 75 y.o. female.    Chief Complaint: Dysuria      HPI: See above       Past Medical History:   Past Medical History:   Diagnosis Date    Anxiety     Breast cancer     Depression     Elevated cholesterol     Recurrent urinary tract infection        Past Surgical Historical:   Past Surgical History:   Procedure Laterality Date    BLADDER SURGERY  09/13/2011    Obtryx Sling (Dr. Medina)    MASTECTOMY Bilateral     NECK SURGERY          Medications:   Medication List with Changes/Refills   Current Medications    AMOXICILLIN-CLAVULANATE 875-125MG (AUGMENTIN) 875-125 MG PER TABLET    Take 1 tablet by mouth 2 (two) times daily.    AZELASTINE (ASTELIN) 137 MCG (0.1 %) NASAL SPRAY    USE 1 SPRAY IN EACH NOSTRIL BID PRN FOR 10 DAYS    BENZONATATE (TESSALON) 100 MG CAPSULE    TK ONE C PO TID FOR 5 DAYS    CLONAZEPAM (KLONOPIN) 1 MG TABLET    TK 1 T PO  QD    NITROFURANTOIN (MACRODANTIN) 100 MG CAPSULE    Take 1 capsule (100 mg total) by mouth 3 (three) times a week.    OXYBUTYNIN (DITROPAN) 5 MG TAB    Take 10 mg by mouth every evening.    PRAVASTATIN (PRAVACHOL) 20 MG TABLET    TK 1 T PO QD        Past Social History:   Social History     Socioeconomic History    Marital status: Single     Spouse name: Not on file    Number of children: Not on file    Years of education: Not on file    Highest education level: Not on file   Occupational History    Not on file   Social Needs    Financial resource strain: Not on file    Food insecurity     Worry: Not on file     Inability: Not on file    Transportation needs     Medical: Not on file     Non-medical: Not on file   Tobacco Use    Smoking status: Never Smoker   Substance and Sexual Activity    Alcohol use: Not Currently    Drug use: Not on file    Sexual activity: Not on file    Lifestyle    Physical activity     Days per week: Not on file     Minutes per session: Not on file    Stress: Not on file   Relationships    Social connections     Talks on phone: Not on file     Gets together: Not on file     Attends Sikhism service: Not on file     Active member of club or organization: Not on file     Attends meetings of clubs or organizations: Not on file     Relationship status: Not on file   Other Topics Concern    Not on file   Social History Narrative    Not on file       Allergies:   Review of patient's allergies indicates:   Allergen Reactions    Morphine         Family History: History reviewed. No pertinent family history.     Review of Systems:  Review of Systems   Constitutional: Negative for activity change and appetite change.   HENT: Negative for congestion and dental problem.    Respiratory: Negative for chest tightness and shortness of breath.    Cardiovascular: Negative for chest pain.   Gastrointestinal: Negative for abdominal distention.   Genitourinary: Negative for decreased urine volume, difficulty urinating, dyspareunia, dysuria, enuresis, flank pain, frequency, genital sores, hematuria, pelvic pain and urgency.   Musculoskeletal: Negative for back pain and neck pain.   Allergic/Immunologic: Negative for immunocompromised state.   Neurological: Negative for dizziness.   Hematological: Negative for adenopathy.   Psychiatric/Behavioral: Negative for agitation, behavioral problems and confusion.       Physical Exam:  Physical Exam  Vitals signs and nursing note reviewed.   Constitutional:       Appearance: She is well-developed.   HENT:      Head: Normocephalic.   Cardiovascular:      Rate and Rhythm: Normal rate and regular rhythm.      Heart sounds: Normal heart sounds.   Pulmonary:      Effort: Pulmonary effort is normal.      Breath sounds: Normal breath sounds.   Abdominal:      General: Bowel sounds are normal.      Palpations: Abdomen is soft.   Skin:      General: Skin is warm and dry.   Neurological:      Mental Status: She is alert and oriented to person, place, and time.     ua appears to be a contaminant  Bladder scan is zero    Assessment/Plan:     rutis will culture urine treat only if pseudomonas or proteus    Urgency with good response to botox    Fu in 4 months  Problem List Items Addressed This Visit     None      Visit Diagnoses     Urge incontinence    -  Primary    Relevant Orders    POCT Bladder Scan (Completed)    POCT Urinalysis (w/Micro Option)

## 2021-04-06 ENCOUNTER — OFFICE VISIT (OUTPATIENT)
Dept: UROLOGY | Facility: CLINIC | Age: 76
End: 2021-04-06
Payer: MEDICARE

## 2021-04-06 VITALS
RESPIRATION RATE: 20 BRPM | HEIGHT: 64 IN | DIASTOLIC BLOOD PRESSURE: 76 MMHG | SYSTOLIC BLOOD PRESSURE: 128 MMHG | BODY MASS INDEX: 26.8 KG/M2 | WEIGHT: 157 LBS

## 2021-04-06 DIAGNOSIS — N39.41 URGE INCONTINENCE: Primary | ICD-10-CM

## 2021-04-06 DIAGNOSIS — N39.0 RECURRENT UTI: ICD-10-CM

## 2021-04-06 LAB — POC RESIDUAL URINE VOLUME: 15 ML (ref 0–100)

## 2021-04-06 PROCEDURE — 99214 PR OFFICE/OUTPT VISIT, EST, LEVL IV, 30-39 MIN: ICD-10-PCS | Mod: S$GLB,,, | Performed by: NURSE PRACTITIONER

## 2021-04-06 PROCEDURE — 51798 US URINE CAPACITY MEASURE: CPT | Mod: S$GLB,,, | Performed by: NURSE PRACTITIONER

## 2021-04-06 PROCEDURE — 99214 OFFICE O/P EST MOD 30 MIN: CPT | Mod: S$GLB,,, | Performed by: NURSE PRACTITIONER

## 2021-04-06 PROCEDURE — 51798 POCT BLADDER SCAN: ICD-10-PCS | Mod: S$GLB,,, | Performed by: NURSE PRACTITIONER

## 2021-04-06 RX ORDER — DIVALPROEX SODIUM 250 MG/1
500 TABLET, DELAYED RELEASE ORAL EVERY 12 HOURS
COMMUNITY
Start: 2021-03-05

## 2021-04-06 RX ORDER — DIVALPROEX SODIUM 125 MG/1
500 CAPSULE, COATED PELLETS ORAL EVERY 12 HOURS
COMMUNITY
Start: 2021-03-31

## 2021-04-06 RX ORDER — HYDROXYZINE PAMOATE 25 MG/1
CAPSULE ORAL
COMMUNITY
Start: 2021-01-04

## 2021-04-06 RX ORDER — TRAZODONE HYDROCHLORIDE 50 MG/1
50 TABLET ORAL NIGHTLY
COMMUNITY
Start: 2021-02-25

## 2021-04-06 RX ORDER — MEMANTINE HYDROCHLORIDE 10 MG/1
10 TABLET ORAL DAILY
COMMUNITY
Start: 2021-02-25

## 2021-04-06 RX ORDER — DONEPEZIL HYDROCHLORIDE 10 MG/1
10 TABLET, FILM COATED ORAL DAILY
COMMUNITY
Start: 2021-01-26

## 2021-04-06 RX ORDER — OLANZAPINE 5 MG/1
TABLET ORAL
COMMUNITY
Start: 2021-01-04

## 2021-04-08 LAB — URINE CULTURE, ROUTINE: NORMAL

## 2021-04-08 RX ORDER — NITROFURANTOIN (MACROCRYSTALS) 100 MG/1
100 CAPSULE ORAL EVERY 12 HOURS
Qty: 14 CAPSULE | Refills: 0 | Status: SHIPPED | OUTPATIENT
Start: 2021-04-08 | End: 2021-04-15

## 2021-04-09 ENCOUNTER — TELEPHONE (OUTPATIENT)
Dept: UROLOGY | Facility: CLINIC | Age: 76
End: 2021-04-09

## 2021-05-13 ENCOUNTER — OFFICE VISIT (OUTPATIENT)
Dept: UROLOGY | Facility: CLINIC | Age: 76
End: 2021-05-13
Payer: MEDICARE

## 2021-05-13 DIAGNOSIS — R32 FEMALE INCONTINENCE: Primary | ICD-10-CM

## 2021-05-13 PROCEDURE — 99214 PR OFFICE/OUTPT VISIT, EST, LEVL IV, 30-39 MIN: ICD-10-PCS | Mod: S$GLB,,, | Performed by: NURSE PRACTITIONER

## 2021-05-13 PROCEDURE — 99214 OFFICE O/P EST MOD 30 MIN: CPT | Mod: S$GLB,,, | Performed by: NURSE PRACTITIONER

## 2021-09-13 ENCOUNTER — TELEPHONE (OUTPATIENT)
Dept: UROLOGY | Facility: CLINIC | Age: 76
End: 2021-09-13

## 2021-09-22 ENCOUNTER — OFFICE VISIT (OUTPATIENT)
Dept: UROLOGY | Facility: CLINIC | Age: 76
End: 2021-09-22
Payer: MEDICARE

## 2021-09-22 VITALS
WEIGHT: 180 LBS | SYSTOLIC BLOOD PRESSURE: 142 MMHG | HEART RATE: 102 BPM | HEIGHT: 64 IN | DIASTOLIC BLOOD PRESSURE: 83 MMHG | RESPIRATION RATE: 18 BRPM | BODY MASS INDEX: 30.73 KG/M2

## 2021-09-22 DIAGNOSIS — N39.0 RECURRENT UTI: ICD-10-CM

## 2021-09-22 DIAGNOSIS — R32 FEMALE INCONTINENCE: Primary | ICD-10-CM

## 2021-09-22 PROCEDURE — 99214 OFFICE O/P EST MOD 30 MIN: CPT | Mod: S$GLB,,, | Performed by: NURSE PRACTITIONER

## 2021-09-22 PROCEDURE — 99214 PR OFFICE/OUTPT VISIT, EST, LEVL IV, 30-39 MIN: ICD-10-PCS | Mod: S$GLB,,, | Performed by: NURSE PRACTITIONER

## 2021-11-18 ENCOUNTER — TELEPHONE (OUTPATIENT)
Dept: UROLOGY | Facility: CLINIC | Age: 76
End: 2021-11-18
Payer: MEDICARE

## 2021-11-18 RX ORDER — NITROFURANTOIN (MACROCRYSTALS) 100 MG/1
100 CAPSULE ORAL EVERY 12 HOURS
Qty: 14 CAPSULE | Refills: 0 | Status: SHIPPED | OUTPATIENT
Start: 2021-11-18 | End: 2021-11-25